# Patient Record
Sex: FEMALE | Race: WHITE | URBAN - METROPOLITAN AREA
[De-identification: names, ages, dates, MRNs, and addresses within clinical notes are randomized per-mention and may not be internally consistent; named-entity substitution may affect disease eponyms.]

---

## 2017-05-17 ENCOUNTER — TRANSFERRED RECORDS (OUTPATIENT)
Dept: HEALTH INFORMATION MANAGEMENT | Facility: CLINIC | Age: 37
End: 2017-05-17
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
## 2017-07-28 ENCOUNTER — OFFICE VISIT (OUTPATIENT)
Dept: FAMILY MEDICINE | Facility: CLINIC | Age: 37
End: 2017-07-28
Payer: COMMERCIAL

## 2017-07-28 VITALS
HEART RATE: 86 BPM | TEMPERATURE: 98.2 F | DIASTOLIC BLOOD PRESSURE: 80 MMHG | OXYGEN SATURATION: 100 % | BODY MASS INDEX: 36.29 KG/M2 | SYSTOLIC BLOOD PRESSURE: 126 MMHG | WEIGHT: 225.8 LBS | HEIGHT: 66 IN

## 2017-07-28 DIAGNOSIS — Z23 NEED FOR PROPHYLACTIC VACCINATION WITH TETANUS-DIPHTHERIA (TD): ICD-10-CM

## 2017-07-28 DIAGNOSIS — K21.9 GASTROESOPHAGEAL REFLUX DISEASE, ESOPHAGITIS PRESENCE NOT SPECIFIED: ICD-10-CM

## 2017-07-28 DIAGNOSIS — Z12.11 SPECIAL SCREENING FOR MALIGNANT NEOPLASMS, COLON: ICD-10-CM

## 2017-07-28 DIAGNOSIS — G89.29 OTHER CHRONIC PAIN: ICD-10-CM

## 2017-07-28 DIAGNOSIS — Z00.01 ENCOUNTER FOR ROUTINE ADULT HEALTH EXAMINATION WITH ABNORMAL FINDINGS: Primary | ICD-10-CM

## 2017-07-28 DIAGNOSIS — Z11.3 SCREEN FOR STD (SEXUALLY TRANSMITTED DISEASE): ICD-10-CM

## 2017-07-28 DIAGNOSIS — Z12.4 SCREENING FOR MALIGNANT NEOPLASM OF CERVIX: ICD-10-CM

## 2017-07-28 DIAGNOSIS — E03.9 HYPOTHYROIDISM, UNSPECIFIED TYPE: ICD-10-CM

## 2017-07-28 DIAGNOSIS — F32.A DEPRESSION, UNSPECIFIED DEPRESSION TYPE: ICD-10-CM

## 2017-07-28 DIAGNOSIS — E66.9 NON MORBID OBESITY, UNSPECIFIED OBESITY TYPE: ICD-10-CM

## 2017-07-28 DIAGNOSIS — N39.3 FEMALE STRESS INCONTINENCE: ICD-10-CM

## 2017-07-28 PROCEDURE — 99385 PREV VISIT NEW AGE 18-39: CPT | Performed by: PHYSICIAN ASSISTANT

## 2017-07-28 PROCEDURE — 87491 CHLMYD TRACH DNA AMP PROBE: CPT | Performed by: PHYSICIAN ASSISTANT

## 2017-07-28 PROCEDURE — 87624 HPV HI-RISK TYP POOLED RSLT: CPT | Performed by: PHYSICIAN ASSISTANT

## 2017-07-28 PROCEDURE — 87591 N.GONORRHOEAE DNA AMP PROB: CPT | Performed by: PHYSICIAN ASSISTANT

## 2017-07-28 PROCEDURE — G0145 SCR C/V CYTO,THINLAYER,RESCR: HCPCS | Performed by: PHYSICIAN ASSISTANT

## 2017-07-28 RX ORDER — LEVOTHYROXINE SODIUM 75 UG/1
175 TABLET ORAL DAILY
Qty: 30 TABLET | Refills: 1 | Status: SHIPPED | OUTPATIENT
Start: 2017-07-28 | End: 2018-01-18

## 2017-07-28 RX ORDER — VENLAFAXINE 100 MG/1
100 TABLET ORAL 3 TIMES DAILY
Status: CANCELLED | OUTPATIENT
Start: 2017-07-28

## 2017-07-28 ASSESSMENT — ANXIETY QUESTIONNAIRES
IF YOU CHECKED OFF ANY PROBLEMS ON THIS QUESTIONNAIRE, HOW DIFFICULT HAVE THESE PROBLEMS MADE IT FOR YOU TO DO YOUR WORK, TAKE CARE OF THINGS AT HOME, OR GET ALONG WITH OTHER PEOPLE: SOMEWHAT DIFFICULT
7. FEELING AFRAID AS IF SOMETHING AWFUL MIGHT HAPPEN: NOT AT ALL
3. WORRYING TOO MUCH ABOUT DIFFERENT THINGS: SEVERAL DAYS
GAD7 TOTAL SCORE: 7
1. FEELING NERVOUS, ANXIOUS, OR ON EDGE: SEVERAL DAYS
6. BECOMING EASILY ANNOYED OR IRRITABLE: SEVERAL DAYS
5. BEING SO RESTLESS THAT IT IS HARD TO SIT STILL: SEVERAL DAYS
2. NOT BEING ABLE TO STOP OR CONTROL WORRYING: MORE THAN HALF THE DAYS

## 2017-07-28 ASSESSMENT — PATIENT HEALTH QUESTIONNAIRE - PHQ9: 5. POOR APPETITE OR OVEREATING: SEVERAL DAYS

## 2017-07-28 NOTE — NURSING NOTE
"Chief Complaint   Patient presents with     Physical       Initial /80 (BP Location: Right arm, Patient Position: Sitting, Cuff Size: Adult Large)  Pulse 86  Temp 98.2  F (36.8  C) (Oral)  Ht 5' 6\" (1.676 m)  Wt 225 lb 12.8 oz (102.4 kg)  SpO2 100%  BMI 36.45 kg/m2 Estimated body mass index is 36.45 kg/(m^2) as calculated from the following:    Height as of this encounter: 5' 6\" (1.676 m).    Weight as of this encounter: 225 lb 12.8 oz (102.4 kg).  Medication Reconciliation: complete   Eliza Sinclair MA  "

## 2017-07-28 NOTE — LETTER
My Depression Action Plan  Name: Blanca Silva   Date of Birth 1980  Date: 7/27/2017    My doctor: Weiler, Karen   My clinic: [facility] [place]  [address]  [place] [address] [phone]          GREEN    ZONE   Good Control    What it looks like:     Things are going generally well. You have normal up s and down s. You may even feel depressed from time to time, but bad moods usually last less than a day.   What you need to do:  1. Continue to care for yourself (see self care plan)  2. Check your depression survival kit and update it as needed  3. Follow your physician s recommendations including any medication.  4. Do not stop taking medication unless you consult with your physician first.           YELLOW         ZONE Getting Worse    What it looks like:     Depression is starting to interfere with your life.     It may be hard to get out of bed; you may be starting to isolate yourself from others.    Symptoms of depression are starting to last most all day and this has happened for several days.     You may have suicidal thoughts but they are not constant.   What you need to do:     1. Call your care team, your response to treatment will improve if you keep your care team informed of your progress. Yellow periods are signs an adjustment may need to be made.     2. Continue your self-care, even if you have to fake it!    3. Talk to someone in your support network    4. Open up your depression survival kit           RED    ZONE Medical Alert - Get Help    What it looks like:     Depression is seriously interfering with your life.     You may experience these or other symptoms: You can t get out of bed most days, can t work or engage in other necessary activities, you have trouble taking care of basic hygiene, or basic responsibilities, thoughts of suicide or death that will not go away, self-injurious behavior.     What you need to do:  1. Call your care team and request a  same-day appointment. If they are not available (weekends or after hours) call your local crisis line, emergency room or 911.      Electronically signed by: Eliza Sinclair, July 27, 2017    Depression Self Care Plan / Survival Kit    Self-Care for Depression  Here s the deal. Your body and mind are really not as separate as most people think.  What you do and think affects how you feel and how you feel influences what you do and think. This means if you do things that people who feel good do, it will help you feel better.  Sometimes this is all it takes.  There is also a place for medication and therapy depending on how severe your depression is, so be sure to consult with your medical provider and/ or Behavioral Health Consultant if your symptoms are worsening or not improving.     In order to better manage my stress, I will:    Exercise  Get some form of exercise, every day. This will help reduce pain and release endorphins, the  feel good  chemicals in your brain. This is almost as good as taking antidepressants!  This is not the same as joining a gym and then never going! (they count on that by the way ) It can be as simple as just going for a walk or doing some gardening, anything that will get you moving.      Hygiene   Maintain good hygiene (Get out of bed in the morning, Make your bed, Brush your teeth, Take a shower, and Get dressed like you were going to work, even if you are unemployed).  If your clothes don't fit try to get ones that do.    Diet  I will strive to eat foods that are good for me, drink plenty of water, and avoid excessive sugar, caffeine, alcohol, and other mood-altering substances.  Some foods that are helpful in depression are: complex carbohydrates, B vitamins, flaxseed, fish or fish oil, fresh fruits and vegetables.    Psychotherapy  I agree to participate in Individual Therapy (if recommended).    Medication  If prescribed medications, I agree to take them.  Missing doses can result  in serious side effects.  I understand that drinking alcohol, or other illicit drug use, may cause potential side effects.  I will not stop my medication abruptly without first discussing it with my provider.    Staying Connected With Others  I will stay in touch with my friends, family members, and my primary care provider/team.    Use your imagination  Be creative.  We all have a creative side; it doesn t matter if it s oil painting, sand castles, or mud pies! This will also kick up the endorphins.    Witness Beauty  (AKA stop and smell the roses) Take a look outside, even in mid-winter. Notice colors, textures. Watch the squirrels and birds.     Service to others  Be of service to others.  There is always someone else in need.  By helping others we can  get out of ourselves  and remember the really important things.  This also provides opportunities for practicing all the other parts of the program.    Humor  Laugh and be silly!  Adjust your TV habits for less news and crime-drama and more comedy.    Control your stress  Try breathing deep, massage therapy, biofeedback, and meditation. Find time to relax each day.     My support system    Clinic Contact:  Phone number:    Contact 1:  Phone number:    Contact 2:  Phone number:    [employer]/:  Phone number:    Therapist:  Phone number:    Local crisis center:    Phone number:    Other community support:  Phone number:

## 2017-07-28 NOTE — LETTER
August 11, 2017    Blanca Reyes  [address]   [facility] [address]    Dear Blanca,  We are happy to inform you that your PAP smear result from 7/28/17 is normal.  We are now able to do a follow up test on PAP smears. The DNA test is for HPV (Human Papilloma Virus). Cervical cancer is closely linked with certain types of HPV. Your result showed no evidence of high risk HPV.  Therefore we recommend you return in 5 years for your next pap smear and HPV test.  You will still need to return to the clinic every year for an annual exam and other preventive tests.  Please contact the clinic at [phone] with any questions.  Sincerely,    Corine Wolfe PA-C/mary

## 2017-07-28 NOTE — MR AVS SNAPSHOT
After Visit Summary   7/28/2017    Blanca Reyes    MRN: [id number]           Patient Information     Date Of Birth          1980        Visit Information        Provider Department      7/28/2017 2:20 PM Corine Wolfe PA-C [facility]        Today's Diagnoses     Special screening for malignant neoplasms, colon    -  1    Screening for malignant neoplasm of cervix        Need for prophylactic vaccination with tetanus-diphtheria (TD)        Routine general medical examination at a health care facility        Gastroesophageal reflux disease, esophagitis presence not specified        Hypothyroidism, unspecified type        Depression, unspecified depression type          Care Instructions      Preventive Health Recommendations  Female Ages 26 - 39  Yearly exam:   See your health care provider every year in order to    Review health changes.     Discuss preventive care.      Review your medicines if you your doctor has prescribed any.    Until age 30: Get a Pap test every three years (more often if you have had an abnormal result).    After age 30: Talk to your doctor about whether you should have a Pap test every 3 years or have a Pap test with HPV screening every 5 years.   You do not need a Pap test if your uterus was removed (hysterectomy) and you have not had cancer.  You should be tested each year for STDs (sexually transmitted diseases), if you're at risk.   Talk to your provider about how often to have your cholesterol checked.  If you are at risk for diabetes, you should have a diabetes test (fasting glucose).  Shots: Get a flu shot each year. Get a tetanus shot every 10 years.   Nutrition:     Eat at least 5 servings of fruits and vegetables each day.    Eat whole-grain bread, whole-wheat pasta and brown rice instead of white grains and rice.    Talk to your provider about Calcium and Vitamin D.     Lifestyle    Exercise at least 150 minutes a week (30 minutes a day, 5  days of the week). This will help you control your weight and prevent disease.    Limit alcohol to one drink per day.    No smoking.     Wear sunscreen to prevent skin cancer.    See your dentist every six months for an exam and cleaning.            Follow-ups after your visit        Additional Services     GASTROENTEROLOGY ADULT REF PROCEDURE ONLY       Last Lab Result: Creatinine (mg/dL)       Date                     Value                 07/09/2012               0.80             ----------  Body mass index is 36.45 kg/(m^2).     Needed:  No  Language:  English    Patient will be contacted to schedule procedure.     Please be aware that coverage of these services is subject to the terms and limitations of your health insurance plan.  Call member services at your health plan with any benefit or coverage questions.  Any procedures must be performed at a [facility] facility OR coordinated by your clinic's referral office.    Please bring the following with you to your appointment:    (1) Any X-Rays, CTs or MRIs which have been performed.  Contact the facility where they were done to arrange for  prior to your scheduled appointment.    (2) List of current medications   (3) This referral request   (4) Any documents/labs given to you for this referral                  Future tests that were ordered for you today     Open Future Orders        Priority Expected Expires Ordered    **Lipid panel reflex to direct LDL FUTURE anytime Routine 7/28/2017 7/28/2018 7/28/2017    **Comprehensive metabolic panel FUTURE anytime Routine 7/28/2017 7/28/2018 7/28/2017    **TSH with free T4 reflex FUTURE anytime Routine 7/28/2017 7/28/2018 7/28/2017    **CBC with platelets FUTURE anytime Routine 7/28/2017 7/28/2018 7/28/2017            Who to contact     If you have questions or need follow up information about today's clinic visit or your schedule please contact [facility] directly at [phone].  Normal  "or non-critical lab and imaging results will be communicated to you by K[employer]ium[employer], letter or phone within 4 business days after the clinic has received the results. If you do not hear from us within 7 days, please contact the clinic through [employer] or phone. If you have a critical or abnormal lab result, we will notify you by phone as soon as possible.  Submit refill requests through [employer] or call your pharmacy and they will forward the refill request to us. Please allow 3 business days for your refill to be completed.          Additional Information About Your Visit        [email or web address] Information     [employer] gives you secure access to your electronic health record. If you see a primary care provider, you can also send messages to your care team and make appointments. If you have questions, please call your primary care clinic.  If you do not have a primary care provider, please call [phone] and they will assist you.        Care EveryWhere ID     This is your Care EveryWhere ID. This could be used by other organizations to access your [facility] medical records  [id number]        Your Vitals Were     Pulse Temperature Height Pulse Oximetry BMI (Body Mass Index)       86 98.2  F (36.8  C) (Oral) 5' 6\" (1.676 m) 100% 36.45 kg/m2        Blood Pressure from Last 3 Encounters:   07/28/17 126/80   02/29/16 138/89   07/11/12 120/83    Weight from Last 3 Encounters:   07/28/17 225 lb 12.8 oz (102.4 kg)   07/10/12 203 lb (92.1 kg)   05/26/11 204 lb 4.8 oz (92.7 kg)              We Performed the Following     DEPRESSION ACTION PLAN (DAP)     GASTROENTEROLOGY ADULT REF PROCEDURE ONLY     HPV High Risk Types DNA Cervical     Pap imaged thin layer screen with HPV - recommended age 30 - 65 years (select HPV order below)          Today's Medication Changes          These changes are accurate as of: 7/28/17  3:24 PM.  If you have any questions, ask your nurse or doctor.               Stop taking these medicines if you haven't " already. Please contact your care team if you have questions.     methocarbamol 750 MG tablet   Commonly known as:  ROBAXIN   Stopped by:  Corine Wolfe PA-C           sertraline 50 MG tablet   Commonly known as:  ZOLOFT   Stopped by:  Corine Wolfe PA-C           traZODone 50 MG tablet   Commonly known as:  DESYREL   Stopped by:  Corine Wolfe PA-C           zolpidem 10 MG tablet   Commonly known as:  AMBIEN   Stopped by:  Corine Wlofe PA-C                    Primary Care Provider Office Phone # Fax #    Karen Weiler, [phone]       [facility] [address] [address]        Equal Access to Services     [facility] : Hadii fransisca ku hadasho Soomaali, waaxda luqadaha, qaybta kaalmada adeegyada, waxay ediliain haydequan agosto . So [facility] [phone].    ATENCIÓN: Si habla español, tiene a warner disposición servicios gratuitos de asistencia lingüística. [facility] [phone].    We comply with applicable federal civil rights laws and [place] laws. We do not discriminate on the basis of race, color, national origin, age, disability sex, sexual orientation or gender identity.            Thank you!     Thank you for choosing [facility]  for your care. Our goal is always to provide you with excellent care. Hearing back from our patients is one way we can continue to improve our services. Please take a few minutes to complete the written survey that you may receive in the mail after your visit with us. Thank you!             Your Updated Medication List - Protect others around you: Learn how to safely use, store and throw away your medicines at www.disposemymeds.org.          This list is accurate as of: 7/28/17  3:24 PM.  Always use your most recent med list.                   Brand Name Dispense Instructions for use Diagnosis    azithromycin 250 MG tablet    ZITHROMAX    6 tablet    Two tablets first day, then one tablet daily for four days     Acute bronchitis       diclofenac 50 MG EC tablet    VOLTAREN    30 tablet    Take 1 tablet by mouth 3 times daily as needed.    Joint pain       gabapentin 300 MG capsule    NEURONTIN    90 capsule    Take 1 capsule by mouth 3 times daily as needed.    Anxiety state, unspecified       * HYDROcodone-acetaminophen 5-500 MG per tablet    VICODIN    120 tablet    Take 2 tablets by mouth every 6 hours as needed for pain.    Joint pain       * HYDROcodone-acetaminophen 5-500 MG per tablet    VICODIN    120 tablet    Take 2 tablets by mouth every 6 hours as needed for pain.    Joint pain       levothyroxine 175 MCG tablet    SYNTHROID/LEVOTHROID    30 tablet    Take 1 tablet by mouth daily.    Unspecified hypothyroidism       OMEPRAZOLE PO      Take 20 mg by mouth 2 times daily (before meals).        venlafaxine 100 MG tablet    EFFEXOR     Take 100 mg by mouth 3 times daily        VISTARIL PO      Take 25 mg by mouth every 6 hours as needed.        * Notice:  This list has 2 medication(s) that are the same as other medications prescribed for you. Read the directions carefully, and ask your doctor or other care provider to review them with you.

## 2017-07-28 NOTE — LETTER
To Whom it May Concern,    Due to mental health diagnoses, Blanca Reyes has an emotional support animal. She should be allowed to have a cat in her place of residence.    Sincerely,    Corine Wolfe PA-C

## 2017-07-29 ASSESSMENT — ANXIETY QUESTIONNAIRES: GAD7 TOTAL SCORE: 7

## 2017-07-29 ASSESSMENT — PATIENT HEALTH QUESTIONNAIRE - PHQ9: SUM OF ALL RESPONSES TO PHQ QUESTIONS 1-9: 7

## 2017-08-01 LAB
C TRACH DNA SPEC QL NAA+PROBE: NORMAL
N GONORRHOEA DNA SPEC QL NAA+PROBE: NORMAL
SPECIMEN SOURCE: NORMAL
SPECIMEN SOURCE: NORMAL

## 2017-08-01 NOTE — PROGRESS NOTES
Dear Blanca,    Your gonorrhea and chlamydia testing came back normal.    Please contact the clinic if you have additional questions.  Thank you.    Sincerely,    Corine Wolfe PA-C

## 2017-08-03 LAB
COPATH REPORT: NORMAL
PAP: NORMAL

## 2017-08-04 LAB
FINAL DIAGNOSIS: NORMAL
HPV HR 12 DNA CVX QL NAA+PROBE: NEGATIVE
HPV16 DNA SPEC QL NAA+PROBE: NEGATIVE
HPV18 DNA SPEC QL NAA+PROBE: NEGATIVE
SPECIMEN DESCRIPTION: NORMAL

## 2017-10-04 DIAGNOSIS — F41.1 ANXIETY STATE: Primary | ICD-10-CM

## 2017-10-04 NOTE — TELEPHONE ENCOUNTER
Venlafaxine (EFFEXOR-XR) 150 MG 24 hr capsule    Last Written Prescription Date: NA  Last Fill Quantity: NA, # refills: NA  Last Office Visit with [facility] or [facility] prescribing provider: 7/28/2017        BP Readings from Last 3 Encounters:   07/28/17 126/80   02/29/16 138/89   07/11/12 120/83     Pulse: (for Fetzima)  Creatinine   Date Value Ref Range Status   07/09/2012 0.80 0.52 - 1.04 mg/dL Final   ]    Last PHQ-9 score on record=   PHQ-9 SCORE 7/28/2017   Total Score -   Total Score 7      Routing refill request to provider for review/approval because:  Medication is reported/historical        Venlafaxine (EFFEXOR-XR) 75 MG 24 hr capsule     Last Written Prescription Date: NA  Last Fill Quantity: NA, # refills: NA  Last Office Visit with [facility], [facility] or [facility] prescribing provider: 7/28/2017        BP Readings from Last 3 Encounters:   07/28/17 126/80   02/29/16 138/89   07/11/12 120/83     Pulse: (for Fetzima)  Creatinine   Date Value Ref Range Status   07/09/2012 0.80 0.52 - 1.04 mg/dL Final   ]    Last PHQ-9 score on record=   PHQ-9 SCORE 7/28/2017   Total Score -   Total Score 7     Routing refill request to provider for review/approval because:  Medication is reported/historical

## 2017-10-06 RX ORDER — VENLAFAXINE HYDROCHLORIDE 150 MG/1
CAPSULE, EXTENDED RELEASE ORAL
Qty: 30 CAPSULE | Refills: 0 | Status: SHIPPED | OUTPATIENT
Start: 2017-10-06 | End: 2017-11-12

## 2017-10-06 RX ORDER — VENLAFAXINE HYDROCHLORIDE 75 MG/1
CAPSULE, EXTENDED RELEASE ORAL
Qty: 30 CAPSULE | Refills: 0 | Status: SHIPPED | OUTPATIENT
Start: 2017-10-06 | End: 2017-11-12

## 2017-11-12 DIAGNOSIS — F41.1 ANXIETY STATE: ICD-10-CM

## 2017-11-13 NOTE — TELEPHONE ENCOUNTER
Requested Prescriptions   Pending Prescriptions Disp Refills     venlafaxine (EFFEXOR-XR) 150 MG 24 hr capsule [Pharmacy Med Name: VENLAFAXINE ER 150MG CAPSULES]  Last Written Prescription Date:  10/6/2017  Last Fill Quantity: 30 capsule,  # refills: 0   Last Office Visit with [facility], [facility] or [facility] prescribing provider:  7/28/2017   Future Office Visit:      30 capsule 0     Sig: TAKE 1 CAPSULE BY MOUTH DAILY    Serotonin-Norepinephrine Reuptake Inhibitors  Failed    11/12/2017 10:18 AM       Failed - PHQ-9 score of less than 5 in past 6 months    Please review PHQ-9 score.   PHQ-9 SCORE 12/2/2008 2/5/2009 7/28/2017   Total Score 3 19 -   Total Score - - 7     PRINCE-7 SCORE 7/28/2017   Total Score 7          Failed - Normal serum creatinine on file in past 12 months    Recent Labs   Lab Test  07/09/12   0155   CR  0.80            Passed - Blood pressure under 140/90    BP Readings from Last 3 Encounters:   07/28/17 126/80   02/29/16 138/89   07/11/12 120/83                Passed - Patient is age 18 or older       Passed - No active pregnancy on record       Passed - No positive pregnancy test in past 12 months       Passed - Recent (6 mo) or future visit with authorizing provider's specialty    Patient had office visit in the last 6 months or has a visit in the next 30 days with authorizing provider.  See chart review.             venlafaxine (EFFEXOR-XR) 75 MG 24 hr capsule [Pharmacy Med Name: VENLAFAXINE ER 75MG CAPSULES]  Last Written Prescription Date:  10/6/2017  Last Fill Quantity: 30 capsule,  # refills: 0   Last Office Visit with [facility], [facility] or [facility] prescribing provider:  7/28/2017   Future Office Visit:      30 capsule 0     Sig: TAKE 1 CAPSULE BY MOUTH DAILY    Serotonin-Norepinephrine Reuptake Inhibitors  Failed    11/12/2017 10:18 AM       Failed - PHQ-9 score of less than 5 in past 6 months    Please review PHQ-9 score.   PHQ-9 SCORE 12/2/2008 2/5/2009 7/28/2017   Total Score 3 19 -   Total Score - - 7      PRINCE-7 SCORE 7/28/2017   Total Score 7          Failed - Normal serum creatinine on file in past 12 months    Recent Labs   Lab Test  07/09/12   0155   CR  0.80            Passed - Blood pressure under 140/90    BP Readings from Last 3 Encounters:   07/28/17 126/80   02/29/16 138/89   07/11/12 120/83                Passed - Patient is age 18 or older       Passed - No active pregnancy on record       Passed - No positive pregnancy test in past 12 months       Passed - Recent (6 mo) or future visit with authorizing provider's specialty    Patient had office visit in the last 6 months or has a visit in the next 30 days with authorizing provider.  See chart review.

## 2017-11-13 NOTE — TELEPHONE ENCOUNTER
Routing refill request to provider for review/approval because:  Labs out of range:  CR  Depression on problem list, does not meet RN criteria to fill for anxiety.     Juliet Mantilla R.N.

## 2017-11-19 RX ORDER — VENLAFAXINE HYDROCHLORIDE 75 MG/1
CAPSULE, EXTENDED RELEASE ORAL
Qty: 30 CAPSULE | Refills: 0 | Status: SHIPPED | OUTPATIENT
Start: 2017-11-19 | End: 2018-01-17

## 2017-11-19 RX ORDER — VENLAFAXINE HYDROCHLORIDE 150 MG/1
CAPSULE, EXTENDED RELEASE ORAL
Qty: 30 CAPSULE | Refills: 0 | Status: SHIPPED | OUTPATIENT
Start: 2017-11-19 | End: 2018-01-17

## 2017-12-04 ENCOUNTER — TRANSFERRED RECORDS (OUTPATIENT)
Dept: HEALTH INFORMATION MANAGEMENT | Facility: CLINIC | Age: 37
End: 2017-12-04

## 2018-01-17 DIAGNOSIS — F41.1 ANXIETY STATE: ICD-10-CM

## 2018-01-17 NOTE — TELEPHONE ENCOUNTER
"  Requested Prescriptions   Pending Prescriptions Disp Refills     venlafaxine (EFFEXOR-XR) 150 MG 24 hr capsule [Pharmacy Med Name: VENLAFAXINE ER 150MG CAPSULES]  Last Written Prescription Date:  11/19/2017  Last Fill Quantity: 30 capsule,  # refills: 0   Last Office Visit with [facility] or [facility] prescribing provider:  8/11/2017   Future Office Visit:      30 capsule 0     Sig: TAKE 1 CAPSULE BY MOUTH DAILY    Serotonin-Norepinephrine Reuptake Inhibitors  Failed    1/17/2018  8:01 AM       Failed - PHQ-9 score of less than 5 in past 6 months    The PHQ-9 criteria is meant to fail. It requires a PHQ-9 score review  PHQ-9 SCORE 12/2/2008 2/5/2009 7/28/2017   Total Score 3 19 -   Total Score - - 7     PRINCE-7 SCORE 7/28/2017   Total Score 7          Failed - Normal serum creatinine on file in past 12 months    Recent Labs   Lab Test  07/09/12   0155   CR  0.80          Passed - Blood pressure under 140/90    BP Readings from Last 3 Encounters:   07/28/17 126/80   02/29/16 138/89   07/11/12 120/83          Passed - Recent or future visit with authorizing provider's specialty    Patient had office visit in the last year or has a visit in the next 30 days with authorizing provider.  See \"Patient Info\" tab in inbasket, or \"Choose Columns\" in Meds & Orders section of the refill encounter.          Passed - Patient is age 18 or older       Passed - No active pregnancy on record       Passed - No positive pregnancy test in past 12 months       Passed - Recent (6 mo) or future visit with authorizing provider's specialty    Patient had office visit in the last 6 months or has a visit in the next 30 days with authorizing provider.  See \"Patient Info\" tab in inEncrypTixet, or \"Choose Columns\" in Meds & Orders section of the refill encounter.                   venlafaxine (EFFEXOR-XR) 75 MG 24 hr capsule [Pharmacy Med Name: VENLAFAXINE ER 75MG CAPSULES]  Last Written Prescription Date:  11/19/2017  Last Fill Quantity: 30 capsule,  # " "refills: 0   Last Office Visit with [facility] or [facility] prescribing provider:  8/11/2017   Future Office Visit:      30 capsule 0     Sig: TAKE 1 CAPSULE BY MOUTH DAILY    Serotonin-Norepinephrine Reuptake Inhibitors  Failed    1/17/2018  8:01 AM       Failed - PHQ-9 score of less than 5 in past 6 months    The PHQ-9 criteria is meant to fail. It requires a PHQ-9 score review  PHQ-9 SCORE 12/2/2008 2/5/2009 7/28/2017   Total Score 3 19 -   Total Score - - 7     PRINCE-7 SCORE 7/28/2017   Total Score 7          Failed - Normal serum creatinine on file in past 12 months    Recent Labs   Lab Test  07/09/12   0155   CR  0.80          Passed - Blood pressure under 140/90    BP Readings from Last 3 Encounters:   07/28/17 126/80   02/29/16 138/89   07/11/12 120/83          Passed - Recent or future visit with authorizing provider's specialty    Patient had office visit in the last year or has a visit in the next 30 days with authorizing provider.  See \"Patient Info\" tab in inbasket, or \"Choose Columns\" in Meds & Orders section of the refill encounter.          Passed - Patient is age 18 or older       Passed - No active pregnancy on record       Passed - No positive pregnancy test in past 12 months       Passed - Recent (6 mo) or future visit with authorizing provider's specialty    Patient had office visit in the last 6 months or has a visit in the next 30 days with authorizing provider.  See \"Patient Info\" tab in inbasket, or \"Choose Columns\" in Meds & Orders section of the refill encounter.             "

## 2018-01-17 NOTE — TELEPHONE ENCOUNTER
Patient is due for office visit as last OV was 7/28/17 (8/11/17 was a no show). Please call patient and set up OV appointment to review for ongoing refills as is due. Also patient needs to establish care with a new provider! If patient is not able to schedule please route back to RN. Thank you, Juliet Mantilla R.N.

## 2018-01-17 NOTE — TELEPHONE ENCOUNTER
Called [phone] and spoke with patient.  She has had insurance issues and just got this through her job.  As she just started a new job, it is hard for her to come in as she is worried about finances.  Can we refill for her for now?  She will make an appt once she has been able to get back on her feet more.  Veronica Scott

## 2018-01-18 DIAGNOSIS — E03.9 HYPOTHYROIDISM, UNSPECIFIED TYPE: ICD-10-CM

## 2018-01-18 RX ORDER — LEVOTHYROXINE SODIUM 75 UG/1
175 TABLET ORAL DAILY
Qty: 30 TABLET | Refills: 0 | Status: SHIPPED | OUTPATIENT
Start: 2018-01-18 | End: 2018-03-14

## 2018-01-18 NOTE — TELEPHONE ENCOUNTER
"Requested Prescriptions   Pending Prescriptions Disp Refills     levothyroxine (SYNTHROID/LEVOTHROID) 75 MCG tablet  Last Written Prescription Date:  7/28/17  Last Fill Quantity: 30,  # refills: 1   Last Office Visit with [facility] or [facility] prescribing provider:  7/28/17   Future Office Visit:      30 tablet 1     Sig: Take 2.5 tablets (187.5 mcg) by mouth daily    Thyroid Protocol Failed    1/18/2018  8:48 AM       Failed - Recent or future visit with authorizing provider's specialty    Patient had office visit in the last year or has a visit in the next 30 days with authorizing provider.  See \"Patient Info\" tab in inbasket, or \"Choose Columns\" in Meds & Orders section of the refill encounter.            Failed - Normal TSH on file in past 12 months    Recent Labs   Lab Test  07/10/12   0910   TSH  0.06*             Passed - Patient is 12 years or older       Passed - No active pregnancy on record    If patient is pregnant or has had a positive pregnancy test, please check TSH.         Passed - No positive pregnancy test in past 12 months    If patient is pregnant or has had a positive pregnancy test, please check TSH.          Medication is being filled for 1 time refill only due to:  Patient needs to be seen because needs labs and office visit to establish care. See previous refill encounter.   Sandy Shell, RN, BSN  [facility]    "

## 2018-01-18 NOTE — TELEPHONE ENCOUNTER
Please see message chain below. Patient has already received courtney supplies. Please advise if OK for additional courtney refill. Thank you.  Sandy Shell RN, BSN  [facility]

## 2018-01-19 ENCOUNTER — TELEPHONE (OUTPATIENT)
Dept: FAMILY MEDICINE | Facility: CLINIC | Age: 38
End: 2018-01-19

## 2018-01-19 ENCOUNTER — NURSE TRIAGE (OUTPATIENT)
Dept: NURSING | Facility: CLINIC | Age: 38
End: 2018-01-19

## 2018-01-19 RX ORDER — VENLAFAXINE HYDROCHLORIDE 75 MG/1
CAPSULE, EXTENDED RELEASE ORAL
Qty: 30 CAPSULE | Refills: 0 | Status: SHIPPED | OUTPATIENT
Start: 2018-01-19 | End: 2018-03-14

## 2018-01-19 RX ORDER — VENLAFAXINE HYDROCHLORIDE 150 MG/1
CAPSULE, EXTENDED RELEASE ORAL
Qty: 30 CAPSULE | Refills: 0 | Status: SHIPPED | OUTPATIENT
Start: 2018-01-19 | End: 2018-03-14

## 2018-01-19 NOTE — TELEPHONE ENCOUNTER
One additional courtney refill. Patient will need to follow up for future refills.    Maxwell Gonzalez DO  1/19/2018 1:27 PM

## 2018-01-19 NOTE — TELEPHONE ENCOUNTER
Discussed with RN supervisor ANYA Schafer RN; unable to continue to extend refills for Blanca.Pt is due to be seen. Thyroid levels also have not been done since 2012--30 day extention extended to her yesterday for medication.      I have returned her call and no answer and I was unable to leave a message as mailbox has not been set up. Juliet Mantilla R.N.

## 2018-01-19 NOTE — TELEPHONE ENCOUNTER
This was refilled for one additional courtney period by Dr. Gonzalez please see encounter for further details. RN needs to call Blanca to discuss patient WILL need to be seen for any further refills! Juliet Mantilla R.N.

## 2018-01-19 NOTE — TELEPHONE ENCOUNTER
"Clinic Action Needed:  Yes, callback  [facility]NA Triage Call  Presenting Problem:    Blanca has been off Effexor for two days and is needing medication today.   Blanca feels very \"out of sorts\"\".   Blanca cannot afford to come into clinic due to shortage of funds.    Please phone Blanca at [phone].      Routed to: RN Pool  Please be sure to close this encounter once this patient's issue/question has been addressed.    Zahira Slater RN/[facility]          "

## 2018-01-19 NOTE — TELEPHONE ENCOUNTER
"Blanca has been off Effexor for two days and is needing medication today.   Blanca feels very \"out of sorts\"\".   Blanca cannot afford to come into clinic due to shortage of funds.    Please phone Blanca at [phone].    "

## 2018-03-14 ENCOUNTER — TELEPHONE (OUTPATIENT)
Dept: FAMILY MEDICINE | Facility: CLINIC | Age: 38
End: 2018-03-14

## 2018-03-14 DIAGNOSIS — F41.1 ANXIETY STATE: ICD-10-CM

## 2018-03-14 DIAGNOSIS — E03.9 HYPOTHYROIDISM, UNSPECIFIED TYPE: ICD-10-CM

## 2018-03-14 RX ORDER — VENLAFAXINE HYDROCHLORIDE 150 MG/1
CAPSULE, EXTENDED RELEASE ORAL
Qty: 2 CAPSULE | Refills: 0 | Status: SHIPPED | OUTPATIENT
Start: 2018-03-14 | End: 2018-03-16

## 2018-03-14 RX ORDER — VENLAFAXINE HYDROCHLORIDE 75 MG/1
CAPSULE, EXTENDED RELEASE ORAL
Qty: 2 CAPSULE | Refills: 0 | Status: SHIPPED | OUTPATIENT
Start: 2018-03-14 | End: 2018-03-16

## 2018-03-14 RX ORDER — LEVOTHYROXINE SODIUM 75 UG/1
175 TABLET ORAL DAILY
Qty: 5 TABLET | Refills: 0 | Status: SHIPPED | OUTPATIENT
Start: 2018-03-14 | End: 2018-03-16

## 2018-03-14 NOTE — TELEPHONE ENCOUNTER
Reason for Call:  Medication or medication refill:    Do you use a [facility]?  Name of the pharmacy and phone number for the current request:     [facility] [id number] [facility] [address] AT [place] [address] & [address]    Name of the medication requested: venlafaxine (EFFEXOR-XR) 150 MG 24 hr capsule    Other request: Patient has been out of medication for 2 days, courtney period used. Missed med check appointment on 3/14, has it rescheduled for 3/16.     Can we leave a detailed message on this number? YES    Phone number patient can be reached at: Home number on file [phone] (home)    Best Time: anytime     Call taken on 3/14/2018 at 12:00 PM by Annie Buckley

## 2018-03-14 NOTE — TELEPHONE ENCOUNTER
Please see previous encounter. Patient has been given courtney refills and no shows. Patient has appointment in 2 days and can review with provider at that time. Blanca no showed appointment today and on 8-11-17. Is 6 YEARS overdue for Thyroid labs for refills for that medication.     I called Blanca and informed her she needs to be seen. I have agreed to send over a 2 day supply for Effexor and Levothyroxine. I informed her she needs to arrive prior to her scheduled appointment to get checked in and follow through with this appointment. She says she will do this.     Will authorize 2 day supply of the above medications for Blanca until she can be seen on 3/16/18. Juliet Mantilla R.N.

## 2018-03-16 ENCOUNTER — OFFICE VISIT (OUTPATIENT)
Dept: FAMILY MEDICINE | Facility: CLINIC | Age: 38
End: 2018-03-16
Payer: COMMERCIAL

## 2018-03-16 VITALS
BODY MASS INDEX: 37.28 KG/M2 | HEIGHT: 66 IN | SYSTOLIC BLOOD PRESSURE: 128 MMHG | TEMPERATURE: 98.8 F | WEIGHT: 232 LBS | DIASTOLIC BLOOD PRESSURE: 86 MMHG | OXYGEN SATURATION: 98 % | HEART RATE: 97 BPM

## 2018-03-16 DIAGNOSIS — F14.11 COCAINE ABUSE IN REMISSION (H): ICD-10-CM

## 2018-03-16 DIAGNOSIS — Z13.6 CARDIOVASCULAR SCREENING; LDL GOAL LESS THAN 160: ICD-10-CM

## 2018-03-16 DIAGNOSIS — R12 HEARTBURN: ICD-10-CM

## 2018-03-16 DIAGNOSIS — E03.9 ACQUIRED HYPOTHYROIDISM: Primary | ICD-10-CM

## 2018-03-16 DIAGNOSIS — F33.0 MILD EPISODE OF RECURRENT MAJOR DEPRESSIVE DISORDER (H): ICD-10-CM

## 2018-03-16 DIAGNOSIS — K21.9 GASTROESOPHAGEAL REFLUX DISEASE, ESOPHAGITIS PRESENCE NOT SPECIFIED: ICD-10-CM

## 2018-03-16 DIAGNOSIS — Z13.1 SCREENING FOR DIABETES MELLITUS: ICD-10-CM

## 2018-03-16 DIAGNOSIS — F41.1 ANXIETY STATE: ICD-10-CM

## 2018-03-16 PROCEDURE — 99214 OFFICE O/P EST MOD 30 MIN: CPT | Performed by: PHYSICIAN ASSISTANT

## 2018-03-16 RX ORDER — LEVOTHYROXINE SODIUM 75 UG/1
TABLET ORAL
Refills: 0 | COMMUNITY
Start: 2018-03-14 | End: 2018-03-16

## 2018-03-16 RX ORDER — VENLAFAXINE HYDROCHLORIDE 75 MG/1
CAPSULE, EXTENDED RELEASE ORAL
Qty: 90 CAPSULE | Refills: 1 | Status: SHIPPED | OUTPATIENT
Start: 2018-03-16

## 2018-03-16 RX ORDER — OMEGA-3 FATTY ACIDS/FISH OIL 300-1000MG
CAPSULE ORAL
COMMUNITY
Start: 2008-10-14

## 2018-03-16 RX ORDER — VENLAFAXINE HYDROCHLORIDE 150 MG/1
CAPSULE, EXTENDED RELEASE ORAL
Qty: 90 CAPSULE | Refills: 1 | Status: SHIPPED | OUTPATIENT
Start: 2018-03-16

## 2018-03-16 RX ORDER — LEVOTHYROXINE SODIUM 75 UG/1
75 TABLET ORAL DAILY
Qty: 30 TABLET | Refills: 0 | Status: SHIPPED | OUTPATIENT
Start: 2018-03-16 | End: 2018-04-24

## 2018-03-16 ASSESSMENT — ANXIETY QUESTIONNAIRES
1. FEELING NERVOUS, ANXIOUS, OR ON EDGE: SEVERAL DAYS
GAD7 TOTAL SCORE: 7
5. BEING SO RESTLESS THAT IT IS HARD TO SIT STILL: SEVERAL DAYS
3. WORRYING TOO MUCH ABOUT DIFFERENT THINGS: SEVERAL DAYS
6. BECOMING EASILY ANNOYED OR IRRITABLE: SEVERAL DAYS
2. NOT BEING ABLE TO STOP OR CONTROL WORRYING: SEVERAL DAYS
IF YOU CHECKED OFF ANY PROBLEMS ON THIS QUESTIONNAIRE, HOW DIFFICULT HAVE THESE PROBLEMS MADE IT FOR YOU TO DO YOUR WORK, TAKE CARE OF THINGS AT HOME, OR GET ALONG WITH OTHER PEOPLE: SOMEWHAT DIFFICULT
7. FEELING AFRAID AS IF SOMETHING AWFUL MIGHT HAPPEN: SEVERAL DAYS

## 2018-03-16 ASSESSMENT — PATIENT HEALTH QUESTIONNAIRE - PHQ9: 5. POOR APPETITE OR OVEREATING: SEVERAL DAYS

## 2018-03-16 NOTE — NURSING NOTE
"Chief Complaint   Patient presents with     Recheck Medication       Initial /86 (BP Location: Right arm, Patient Position: Sitting, Cuff Size: Adult Regular)  Pulse 97  Temp 98.8  F (37.1  C) (Oral)  Ht 5' 6\" (1.676 m)  Wt 232 lb (105.2 kg)  LMP 03/02/2018  SpO2 98%  BMI 37.45 kg/m2 Estimated body mass index is 37.45 kg/(m^2) as calculated from the following:    Height as of this encounter: 5' 6\" (1.676 m).    Weight as of this encounter: 232 lb (105.2 kg).  Medication Reconciliation: complete   Eliza Sinclair MA    "

## 2018-03-16 NOTE — PATIENT INSTRUCTIONS
Start slow taper of omeprazole due to long-term risks. Decrease by 1 tab every 2-3 weeks.  Cut back on coffee, wine and avoid eating largest meal before bedtime.  If has recurrent heartburn/GERD will need to complete an EGD.  Repeat labs to assess thyroid. Can consider dose adjustment if needed pending lab review.   Glad things are going well on effexor. Continue without changes.  Let me know if you wish to see counseling.  Meds re-filled.    Electronically Signed By: Deisy Garcia PA-C

## 2018-03-16 NOTE — MR AVS SNAPSHOT
After Visit Summary   3/16/2018    Blanca Reyes    MRN: [id number]           Patient Information     Date Of Birth          1980        Visit Information        Provider Department      3/16/2018 11:20 AM Deisy Garcia PA-C [facility] ([facility]        Today's Diagnoses     Acquired hypothyroidism    -  1    Need for prophylactic vaccination with tetanus-diphtheria (TD)        CARDIOVASCULAR SCREENING; LDL GOAL LESS THAN 160        Screening for diabetes mellitus        Cocaine abuse in remission        Anxiety state        Mild episode of recurrent major depressive disorder (H)        Heartburn        Gastroesophageal reflux disease, esophagitis presence not specified          Care Instructions    Start slow taper of omeprazole due to long-term risks. Decrease by 1 tab every 2-3 weeks.  Cut back on coffee, wine and avoid eating largest meal before bedtime.  If has recurrent heartburn/GERD will need to complete an EGD.  Repeat labs to assess thyroid. Can consider dose adjustment if needed pending lab review.   Glad things are going well on effexor. Continue without changes.  Let me know if you wish to see counseling.  Meds re-filled.    Electronically Signed By: Deisy Garcia PA-C            Follow-ups after your visit        Who to contact     If you have questions or need follow up information about today's clinic visit or your schedule please contact [facility] [place] directly at [phone].  Normal or non-critical lab and imaging results will be communicated to you by MyChart, letter or phone within 4 business days after the clinic has received the results. If you do not hear from us within 7 days, please contact the clinic through [employer] or phone. If you have a critical or abnormal lab result, we will notify you by phone as soon as possible.  Submit refill requests through [employer] or call your pharmacy and they will forward the refill request to us. Please  "allow 3 business days for your refill to be completed.          Additional Information About Your Visit        [employer] Information     [employer] gives you secure access to your electronic health record. If you see a primary care provider, you can also send messages to your care team and make appointments. If you have questions, please call your primary care clinic.  If you do not have a primary care provider, please call [phone] and they will assist you.        Care EveryWhere ID     This is your Care EveryWhere ID. This could be used by other organizations to access your [facility] medical records  [id number]        Your Vitals Were     Pulse Temperature Height Last Period Pulse Oximetry BMI (Body Mass Index)    97 98.8  F (37.1  C) (Oral) 5' 6\" (1.676 m) 03/02/2018 98% 37.45 kg/m2       Blood Pressure from Last 3 Encounters:   03/16/18 128/86   07/28/17 126/80   02/29/16 138/89    Weight from Last 3 Encounters:   03/16/18 232 lb (105.2 kg)   07/28/17 225 lb 12.8 oz (102.4 kg)   07/10/12 203 lb (92.1 kg)              We Performed the Following     CBC with platelets     Comprehensive metabolic panel     Lipid panel reflex to direct LDL Fasting     Magnesium     TSH with free T4 reflex          Today's Medication Changes          These changes are accurate as of 3/16/18 12:20 PM.  If you have any questions, ask your nurse or doctor.               These medicines have changed or have updated prescriptions.        Dose/Directions    levothyroxine 75 MCG tablet   Commonly known as:  SYNTHROID/LEVOTHROID   This may have changed:    - how much to take  - how to take this  - when to take this   Used for:  Acquired hypothyroidism   Changed by:  Deisy Garcia PA-C        Dose:  75 mcg   Take 1 tablet (75 mcg) by mouth daily   Quantity:  30 tablet   Refills:  0       omeprazole 20 MG CR capsule   Commonly known as:  priLOSEC   This may have changed:  additional instructions   Used for:  Gastroesophageal " reflux disease, esophagitis presence not specified   Changed by:  Deisy Garcia PA-C        Dose:  20 mg   Take 1 capsule (20 mg) by mouth daily Start taper per recommendations   Quantity:  90 capsule   Refills:  1       * venlafaxine 150 MG 24 hr capsule   Commonly known as:  EFFEXOR-XR   This may have changed:  additional instructions   Used for:  Anxiety state   Changed by:  Deisy Garcia PA-C        TAKE 1 CAPSULE BY MOUTH DAILY in addition to 75mg effexor   Quantity:  90 capsule   Refills:  1       * venlafaxine 75 MG 24 hr capsule   Commonly known as:  EFFEXOR-XR   This may have changed:  additional instructions   Used for:  Anxiety state   Changed by:  Deisy Garcia PA-C        TAKE 1 CAPSULE BY MOUTH DAILY in addition to 100mg effexor daily   Quantity:  90 capsule   Refills:  1       * Notice:  This list has 2 medication(s) that are the same as other medications prescribed for you. Read the directions carefully, and ask your doctor or other care provider to review them with you.         Where to get your medicines      These medications were sent to [facility] [id number] AT [facility] [address], [facility] [address]    Hours:  24-hours Phone:  [phone]     levothyroxine 75 MCG tablet    omeprazole 20 MG CR capsule    venlafaxine 150 MG 24 hr capsule    venlafaxine 75 MG 24 hr capsule                Primary Care Provider Office Phone # Fax #    Karen Weiler, [phone] [address]  [place] [address]        Equal Access to Services     [facility] AH: Hadii aad ku hadasho Soomaali, waaxda luqadaha, qaybta kaalmada adeegyada, kenneth coe. So [facility] [phone].    ATENCIÓN: Si habla español, tiene a warner disposición servicios gratuitos de asistencia lingüística. Xavierame al [phone].    We comply with applicable federal civil rights laws  and [place] laws. We do not discriminate on the basis of race, color, national origin, age, disability, sex, sexual orientation, or gender identity.            Thank you!     Thank you for choosing [facility]  for your care. Our goal is always to provide you with excellent care. Hearing back from our patients is one way we can continue to improve our services. Please take a few minutes to complete the written survey that you may receive in the mail after your visit with us. Thank you!             Your Updated Medication List - Protect others around you: Learn how to safely use, store and throw away your medicines at www.disposemymeds.org.          This list is accurate as of 3/16/18 12:20 PM.  Always use your most recent med list.                   Brand Name Dispense Instructions for use Diagnosis    gabapentin 300 MG capsule    NEURONTIN    90 capsule    Take 1 capsule by mouth 3 times daily as needed.    Anxiety state, unspecified       ibuprofen 200 MG capsule           levothyroxine 75 MCG tablet    SYNTHROID/LEVOTHROID    30 tablet    Take 1 tablet (75 mcg) by mouth daily    Acquired hypothyroidism       omeprazole 20 MG CR capsule    priLOSEC    90 capsule    Take 1 capsule (20 mg) by mouth daily Start taper per recommendations    Gastroesophageal reflux disease, esophagitis presence not specified       * venlafaxine 150 MG 24 hr capsule    EFFEXOR-XR    90 capsule    TAKE 1 CAPSULE BY MOUTH DAILY in addition to 75mg effexor    Anxiety state       * venlafaxine 75 MG 24 hr capsule    EFFEXOR-XR    90 capsule    TAKE 1 CAPSULE BY MOUTH DAILY in addition to 100mg effexor daily    Anxiety state       * Notice:  This list has 2 medication(s) that are the same as other medications prescribed for you. Read the directions carefully, and ask your doctor or other care provider to review them with you.

## 2018-03-16 NOTE — PROGRESS NOTES
"  SUBJECTIVE:   Blanca Reyes is a 38 year old female who presents to clinic today for the following health issues:      Hypothyroidism Follow-up; states just takes 1 tab of 75mcg in the morning.   Had last physical in July and had orders pended for future, but appears these were not completed.  Pt reports having had thyroid checked in the past 2 yrs and believes labs were stable at that point. These were done at an outside [facility] facility.   Was found on screening after she had her children at age 20.      Since last visit, patient describes the following symptoms: Weight stable, no hair loss, no skin changes, no constipation, no loose stools    Amount of exercise or physical activity: None    Problems taking medications regularly: No    Medication side effects: none    Diet: regular (no restrictions)      Medication Followup of Effexor; has taken for 5 yrs. Very helpful.  Can tell if she runs out of medication that it impacts her.   Hx of depression/anxiety.   Admits has always suffered with these probably all her life.  Has tried prozac and celexa in the past - can't recall why was helpful or not.  Notices she is a lot more \"smooth sailing\" with effexor, not so \"up and down.\"  Had eval with psychiatry about 5-6 years ago.  Dealing with some social stressors with court issues with her ex-.  EPIC review shows history of cocaine abuse and pt happy to report she has been in remission since 2010.  Also had reported diagnosis of bipolar, but pt feels this was during time when used cocaine and had bad relationship with her ex-. Pt does not feel this is an accurate diagnosis as is doing better since no longer having issues with this.       Taking Medication as prescribed: yes    Side Effects:  None    Medication Helping Symptoms:  yes       3) Heartburn/GERD. Started on it when pregnant with her son. Has been on it now for the past 2-3 years.   Kind of a spicy food eater.  Eats between 6-7 and in bed by " "10 and largest.   Sochx: alcohol 2 glasses of wine 2x per week.  No soda  Coffee - \"too much\" probably 2-3 cups per day.   It stops omeprazole can have worsening heartburn. Has never tried a slow taper off of this though.  No EGD.    Problem list and histories reviewed & adjusted, as indicated.  Additional history: as documented    Patient Active Problem List   Diagnosis     Tobacco use disorder     Cocaine abuse in remission     Hepatitis C carrier (H)     Migraine     Anxiety state     Joint pain     CARDIOVASCULAR SCREENING; LDL GOAL LESS THAN 160     Non morbid obesity, unspecified obesity type     Acquired hypothyroidism     Mild episode of recurrent major depressive disorder (H)     Gastroesophageal reflux disease, esophagitis presence not specified     Heartburn     Past Surgical History:   Procedure Laterality Date     C  DELIVERY ONLY      , Low Cervical - breech     CHOLECYSTECTOMY, LAPOROSCOPIC  3/2/2011    Cholecystectomy, Laparoscopic     HC CORRECT BUNION,SIMPLE       HC EXC BENIGN SKIN LESION SCLP/NCK/HNDS/FEET/GEN 2.1-3.0 CM      right forearm       Social History   Substance Use Topics     Smoking status: Current Every Day Smoker     Packs/day: 0.50     Smokeless tobacco: Not on file     Alcohol use Yes      Comment: vodka \"not very often\"     Family History   Problem Relation Age of Onset     Hypertension Father      DIABETES Daughter      Breast Cancer Maternal Aunt      Cancer - colorectal No family hx of          Current Outpatient Prescriptions   Medication Sig Dispense Refill     ibuprofen 200 MG capsule        venlafaxine (EFFEXOR-XR) 150 MG 24 hr capsule TAKE 1 CAPSULE BY MOUTH DAILY in addition to 75mg effexor 90 capsule 1     venlafaxine (EFFEXOR-XR) 75 MG 24 hr capsule TAKE 1 CAPSULE BY MOUTH DAILY in addition to 100mg effexor daily 90 capsule 1     levothyroxine (SYNTHROID/LEVOTHROID) 75 MCG tablet Take 1 tablet (75 mcg) by mouth daily 30 tablet 0     omeprazole " "(PRILOSEC) 20 MG CR capsule Take 1 capsule (20 mg) by mouth daily Start taper per recommendations 90 capsule 1     gabapentin (NEURONTIN) 300 MG capsule Take 1 capsule by mouth 3 times daily as needed. 90 capsule 0     [DISCONTINUED] levothyroxine (SYNTHROID/LEVOTHROID) 75 MCG tablet   0     [DISCONTINUED] venlafaxine (EFFEXOR-XR) 150 MG 24 hr capsule TAKE 1 CAPSULE BY MOUTH DAILY 2 capsule 0     [DISCONTINUED] venlafaxine (EFFEXOR-XR) 75 MG 24 hr capsule TAKE 1 CAPSULE BY MOUTH DAILY 2 capsule 0     [DISCONTINUED] levothyroxine (SYNTHROID/LEVOTHROID) 75 MCG tablet Take 2.5 tablets (187.5 mcg) by mouth daily 5 tablet 0     [DISCONTINUED] venlafaxine (EFFEXOR) 100 MG tablet Take 100 mg by mouth 3 times daily       Allergies   Allergen Reactions     Tramadol Nausea and Vomiting     Ketorolac Tromethamine      Other reaction(s): Unknown  \"felt like crawling out of skin\"     Morphine      Other reaction(s): Nausea & Vomiting     No Known Drug Allergies      Oxycodone-Acetaminophen      Other reaction(s): Nausea & Vomiting     Quetiapine      Other reaction(s): Other (See Comments)  \"can't wake up next day\"       Reviewed and updated as needed this visit by clinical staff  Allergies  Meds       Reviewed and updated as needed this visit by Provider  Allergies  Meds         ROS:  Constitutional, HEENT, cardiovascular, pulmonary, gi and gu, endocrine, psych systems are negative, except as otherwise noted.    OBJECTIVE:     /86 (BP Location: Right arm, Patient Position: Sitting, Cuff Size: Adult Regular)  Pulse 97  Temp 98.8  F (37.1  C) (Oral)  Ht 5' 6\" (1.676 m)  Wt 232 lb (105.2 kg)  LMP 03/02/2018  SpO2 98%  BMI 37.45 kg/m2  Body mass index is 37.45 kg/(m^2).  GENERAL: healthy, alert and no distress  EYES: Eyes grossly normal to inspection, PERRL and conjunctivae and sclerae normal  NECK: no adenopathy, no asymmetry, masses, or scars and thyroid normal to palpation  PSYCH: mentation appears normal, " affect normal/bright    Diagnostic Test Results:  See flowsheets for PHQ/PRINCE scores    ASSESSMENT/PLAN:       ICD-10-CM    1. Acquired hypothyroidism E03.9 levothyroxine (SYNTHROID/LEVOTHROID) 75 MCG tablet     TSH with free T4 reflex     Lipid panel reflex to direct LDL Fasting     Comprehensive metabolic panel (BMP + Alb, Alk Phos, ALT, AST, Total. Bili, TP)     CBC with platelets     Magnesium     CANCELED: TSH with free T4 reflex     CANCELED: Lipid panel reflex to direct LDL Fasting     CANCELED: Comprehensive metabolic panel     CANCELED: CBC with platelets   2. CARDIOVASCULAR SCREENING; LDL GOAL LESS THAN 160 Z13.6 TSH with free T4 reflex     Lipid panel reflex to direct LDL Fasting     Comprehensive metabolic panel (BMP + Alb, Alk Phos, ALT, AST, Total. Bili, TP)     CBC with platelets     Magnesium   3. Screening for diabetes mellitus Z13.1 TSH with free T4 reflex     Lipid panel reflex to direct LDL Fasting     Comprehensive metabolic panel (BMP + Alb, Alk Phos, ALT, AST, Total. Bili, TP)     CBC with platelets     Magnesium   4. Cocaine abuse in remission F14.11 TSH with free T4 reflex     Lipid panel reflex to direct LDL Fasting     Comprehensive metabolic panel (BMP + Alb, Alk Phos, ALT, AST, Total. Bili, TP)     CBC with platelets     Magnesium   5. Anxiety state F41.1 venlafaxine (EFFEXOR-XR) 150 MG 24 hr capsule     venlafaxine (EFFEXOR-XR) 75 MG 24 hr capsule     TSH with free T4 reflex     Lipid panel reflex to direct LDL Fasting     Comprehensive metabolic panel (BMP + Alb, Alk Phos, ALT, AST, Total. Bili, TP)     CBC with platelets     Magnesium   6. Mild episode of recurrent major depressive disorder (H) F33.0 TSH with free T4 reflex     Lipid panel reflex to direct LDL Fasting     Comprehensive metabolic panel (BMP + Alb, Alk Phos, ALT, AST, Total. Bili, TP)     CBC with platelets     Magnesium   7. Heartburn R12 TSH with free T4 reflex     Lipid panel reflex to direct LDL Fasting      Comprehensive metabolic panel (BMP + Alb, Alk Phos, ALT, AST, Total. Bili, TP)     CBC with platelets     Magnesium     CANCELED: Magnesium   8. Gastroesophageal reflux disease, esophagitis presence not specified K21.9 omeprazole (PRILOSEC) 20 MG CR capsule     TSH with free T4 reflex     Lipid panel reflex to direct LDL Fasting     Comprehensive metabolic panel (BMP + Alb, Alk Phos, ALT, AST, Total. Bili, TP)     CBC with platelets     Magnesium     CANCELED: Magnesium   See Patient Instructions  Pt in agreement with plan.  She additionally brought up concerns about shoulder pain, but was advised to schedule another appt to address this.  She also then stated she had to get to work so was scheduled for lab appt on 3/20 instead of completing today.  Patient Instructions   Start slow taper of omeprazole due to long-term risks. Decrease by 1 tab every 2-3 weeks.  Cut back on coffee, wine and avoid eating largest meal before bedtime.  If has recurrent heartburn/GERD will need to complete an EGD.  Repeat labs to assess thyroid. Can consider dose adjustment if needed pending lab review.   Glad things are going well on effexor. Continue without changes.  Let me know if you wish to see counseling.  Meds re-filled.    Electronically Signed By: Deisy Garcia PA-C

## 2018-03-17 ASSESSMENT — ANXIETY QUESTIONNAIRES: GAD7 TOTAL SCORE: 7

## 2018-03-17 ASSESSMENT — PATIENT HEALTH QUESTIONNAIRE - PHQ9: SUM OF ALL RESPONSES TO PHQ QUESTIONS 1-9: 5

## 2018-04-16 ENCOUNTER — TRANSFERRED RECORDS (OUTPATIENT)
Dept: HEALTH INFORMATION MANAGEMENT | Facility: CLINIC | Age: 38
End: 2018-04-16

## 2018-04-24 DIAGNOSIS — E03.9 ACQUIRED HYPOTHYROIDISM: ICD-10-CM

## 2018-04-24 NOTE — TELEPHONE ENCOUNTER
Routing refill request to provider for review/approval because:  Carly given x1 and patient did not follow up, please advise  Labs not current:  Not completed at visit on 3/16/18  Sandy Shell RN, BSN  [facility]

## 2018-04-24 NOTE — TELEPHONE ENCOUNTER
"Requested Prescriptions   Pending Prescriptions Disp Refills     levothyroxine (SYNTHROID/LEVOTHROID) 75 MCG tablet [Pharmacy Med Name: LEVOTHYROXINE 0.075MG (75MCG) TABS]  Last Written Prescription Date:  3/16/2018  Last Fill Quantity: 30 tablet,  # refills: 0   Last office visit: 3/16/2018 with prescribing provider:  Radha   Future Office Visit:       30 tablet 0     Sig: TAKE 1 TABLET(75 MCG) BY MOUTH DAILY    Thyroid Protocol Failed    4/24/2018  7:32 AM       Failed - Normal TSH on file in past 12 months    Recent Labs   Lab Test  07/10/12   0910   TSH  0.06*             Passed - Patient is 12 years or older       Passed - Recent (12 mo) or future (30 days) visit within the authorizing provider's specialty    Patient had office visit in the last 12 months or has a visit in the next 30 days with authorizing provider or within the authorizing provider's specialty.  See \"Patient Info\" tab in inbasket, or \"Choose Columns\" in Meds & Orders section of the refill encounter.           Passed - No active pregnancy on record    If patient is pregnant or has had a positive pregnancy test, please check TSH.         Passed - No positive pregnancy test in past 12 months    If patient is pregnant or has had a positive pregnancy test, please check TSH.            "

## 2018-04-25 RX ORDER — LEVOTHYROXINE SODIUM 75 UG/1
75 TABLET ORAL DAILY
Qty: 30 TABLET | Refills: 0 | Status: SHIPPED | OUTPATIENT
Start: 2018-04-25 | End: 2018-06-13

## 2018-04-25 NOTE — TELEPHONE ENCOUNTER
Please call pt and notify she is due for labs before further refills.  Electronically Signed By: Deisy Garcia PA-C

## 2018-06-11 DIAGNOSIS — E03.9 ACQUIRED HYPOTHYROIDISM: ICD-10-CM

## 2018-06-11 NOTE — TELEPHONE ENCOUNTER
Routing refill request to provider for review/approval because:  Carly given x1 and patient did not follow up, please advise  Labs out of range:  TSH  Labs not current:  TSH. Patient was to complete labs at last appointment in March and did not complete.  Possible break in medication  Sandy Shell, RN, BSN  [facility]

## 2018-06-11 NOTE — TELEPHONE ENCOUNTER
Reason for Call:  Medication or medication refill:    Do you use a [facility]?  Name of the pharmacy and phone number for the current request:  [facility] [place]    Name of the medication requested: levothyroxine (SYNTHROID/LEVOTHROID) 75 MCG tablet    Other request: na    Can we leave a detailed message on this number? NO    Phone number patient can be reached at: Home number on file [phone] (home)    Best Time: any    Call taken on 6/11/2018 at 10:54 AM by Gregoria Silva

## 2018-06-11 NOTE — TELEPHONE ENCOUNTER
"Requested Prescriptions   Pending Prescriptions Disp Refills     levothyroxine (SYNTHROID/LEVOTHROID) 75 MCG tablet [Pharmacy Med Name: LEVOTHYROXINE 0.075MG (75MCG) TABS]  Last Written Prescription Date:  4/25/18  Last Fill Quantity: 30,  # refills: 0   Last office visit: 3/16/2018 with prescribing provider:  Radha   Future Office Visit:     30 tablet 0     Sig: TAKE 1 TABLET BY MOUTH EVERY DAY. DUE FOR LABS    Thyroid Protocol Failed    6/11/2018 10:56 AM       Failed - Normal TSH on file in past 12 months    Recent Labs   Lab Test  07/10/12   0910   TSH  0.06*             Passed - Patient is 12 years or older       Passed - Recent (12 mo) or future (30 days) visit within the authorizing provider's specialty    Patient had office visit in the last 12 months or has a visit in the next 30 days with authorizing provider or within the authorizing provider's specialty.  See \"Patient Info\" tab in inbasket, or \"Choose Columns\" in Meds & Orders section of the refill encounter.           Passed - No active pregnancy on record    If patient is pregnant or has had a positive pregnancy test, please check TSH.         Passed - No positive pregnancy test in past 12 months    If patient is pregnant or has had a positive pregnancy test, please check TSH.            "

## 2018-06-13 RX ORDER — LEVOTHYROXINE SODIUM 75 UG/1
75 TABLET ORAL DAILY
Qty: 30 TABLET | Refills: 0 | Status: SHIPPED | OUTPATIENT
Start: 2018-06-13

## 2018-06-13 RX ORDER — LEVOTHYROXINE SODIUM 75 UG/1
TABLET ORAL
Qty: 30 TABLET | Refills: 0 | OUTPATIENT
Start: 2018-06-13

## 2018-06-13 NOTE — TELEPHONE ENCOUNTER
Pt has lab only scheduled for 6/20/2018.  Can we provide fill to cover until then so her labs will not be off?  Veroinca Scott

## 2018-06-13 NOTE — TELEPHONE ENCOUNTER
Needs appt for lab completion. Please notify on-nancy scripts will not be given until has lab follow-up.  Electronically Signed By: Deisy Garcia PA-C

## 2019-07-09 DIAGNOSIS — E03.9 ACQUIRED HYPOTHYROIDISM: ICD-10-CM

## 2019-07-09 RX ORDER — LEVOTHYROXINE SODIUM 75 UG/1
TABLET ORAL
Qty: 30 TABLET | Refills: 0 | OUTPATIENT
Start: 2019-07-09

## 2019-07-09 NOTE — TELEPHONE ENCOUNTER
Patient no longer under our care. Rx denied.  Sandy Shell, ELIUDN, RN  [facility]

## 2019-11-05 ENCOUNTER — HEALTH MAINTENANCE LETTER (OUTPATIENT)
Age: 39
End: 2019-11-05

## 2020-11-22 ENCOUNTER — HEALTH MAINTENANCE LETTER (OUTPATIENT)
Age: 40
End: 2020-11-22

## 2021-09-19 ENCOUNTER — HEALTH MAINTENANCE LETTER (OUTPATIENT)
Age: 41
End: 2021-09-19

## 2022-01-09 ENCOUNTER — HEALTH MAINTENANCE LETTER (OUTPATIENT)
Age: 42
End: 2022-01-09

## 2022-02-17 PROBLEM — K21.9 GASTROESOPHAGEAL REFLUX DISEASE: Status: ACTIVE | Noted: 2018-03-16

## 2022-11-21 ENCOUNTER — HEALTH MAINTENANCE LETTER (OUTPATIENT)
Age: 42
End: 2022-11-21

## 2023-04-16 ENCOUNTER — HEALTH MAINTENANCE LETTER (OUTPATIENT)
Age: 43
End: 2023-04-16

## 2024-02-03 ENCOUNTER — HEALTH MAINTENANCE LETTER (OUTPATIENT)
Age: 44
End: 2024-02-03